# Patient Record
Sex: FEMALE | Race: BLACK OR AFRICAN AMERICAN | NOT HISPANIC OR LATINO | Employment: STUDENT | URBAN - METROPOLITAN AREA
[De-identification: names, ages, dates, MRNs, and addresses within clinical notes are randomized per-mention and may not be internally consistent; named-entity substitution may affect disease eponyms.]

---

## 2020-11-20 ENCOUNTER — OFFICE VISIT (OUTPATIENT)
Dept: CARDIOLOGY | Facility: CLINIC | Age: 25
End: 2020-11-20
Payer: COMMERCIAL

## 2020-11-20 VITALS
SYSTOLIC BLOOD PRESSURE: 125 MMHG | WEIGHT: 170.63 LBS | HEIGHT: 63 IN | BODY MASS INDEX: 30.23 KG/M2 | DIASTOLIC BLOOD PRESSURE: 81 MMHG | OXYGEN SATURATION: 99 % | HEART RATE: 72 BPM

## 2020-11-20 DIAGNOSIS — R55 SYNCOPE, UNSPECIFIED SYNCOPE TYPE: Primary | ICD-10-CM

## 2020-11-20 DIAGNOSIS — R55 SYNCOPE AND COLLAPSE: ICD-10-CM

## 2020-11-20 PROCEDURE — 99205 PR OFFICE/OUTPT VISIT, NEW, LEVL V, 60-74 MIN: ICD-10-PCS | Mod: 25,S$GLB,, | Performed by: INTERNAL MEDICINE

## 2020-11-20 PROCEDURE — 93000 EKG 12-LEAD: ICD-10-PCS | Mod: S$GLB,,, | Performed by: INTERNAL MEDICINE

## 2020-11-20 PROCEDURE — 99999 PR PBB SHADOW E&M-NEW PATIENT-LVL III: ICD-10-PCS | Mod: PBBFAC,,, | Performed by: INTERNAL MEDICINE

## 2020-11-20 PROCEDURE — 99999 PR PBB SHADOW E&M-NEW PATIENT-LVL III: CPT | Mod: PBBFAC,,, | Performed by: INTERNAL MEDICINE

## 2020-11-20 PROCEDURE — 99205 OFFICE O/P NEW HI 60 MIN: CPT | Mod: 25,S$GLB,, | Performed by: INTERNAL MEDICINE

## 2020-11-20 PROCEDURE — 93000 ELECTROCARDIOGRAM COMPLETE: CPT | Mod: S$GLB,,, | Performed by: INTERNAL MEDICINE

## 2020-11-20 NOTE — PROGRESS NOTES
"  Subjective:      Patient ID: Sophia العلي is a 25 y.o. female.    Chief Complaint: Loss of Consciousness    HPI:  Pt has been fainting since age 14.    Pt used to see Dr Raffy Lindsey.    Pt works as a pharmacist.    Pt was standing in wedding on the altar last Saturday when pt felt woozy and internally pt felt warm.  Then pt fell down.  Pt felt uneasy afterwards.  Pt took fluids and rested.    Last faint before this month was 2016.  Pt had 2 episodes.  Pt was waiting to be seated at [facility]. Pt was standing. It was crowded.  Pt had no premonitory warning. No injury. Pt was unresponsive for 30 seconds.  [facility].       Also in 2016 pt fainted while sitting on an airplane.  Pt is not sure how long she was unconscious for.    Once in high school pt vomited after fainting.    Pt has seen several neurologists.  "They do not think it is seizures.    There is no hx of injury associated with fainting except with the very first episode of fainting age 14.  The pt was in school in art class.  Pt was standing in a classroom.  Pt fainted without warning and broke her nose.    There is no hx of incontinence.    Dr Lindsey gave pt midodrine for orthostatic hypotension.  Pt stopped it because it made pt feel jittery.    Pt did not faint while taking midodrine.    This past Saturday was the first faint since 2016.  Pt has been off the midodrine since 2017.    Pt has had brain MRI's echocardiograms and has worn several heart monitors, stress test, and had a coronary angiogram.    Pt's mother said pt was born with an irregular heart beat but the pediatrician told her she would grow out of it.    Pt has worn support  Stockings in the past.  Pt fainted on the airplane while wearing support stockings.    Pt used to have "really bad migraines" often triggered by light.  Pt would have throbbing headaches around her eyes.  Pt did not faint with her migraines.    Pt hasn't had a bad migraine for years.    Pt has had fainting " episodes at school and in the mall and twice on a plane and in [place] and once at work.      Review of Systems   Cardiovascular: Positive for syncope. Negative for chest pain, claudication, dyspnea on exertion, irregular heartbeat, leg swelling, near-syncope, orthopnea and palpitations.        Past Medical History:   Diagnosis Date    Allergy     Bee sting allergy     Cat allergies     hair    Migraines     Syncope and collapse         Past Surgical History:   Procedure Laterality Date    none         Family History   Problem Relation Age of Onset    Hypertension Father     Breast cancer Maternal Grandmother     Cancer Maternal Grandfather     Breast cancer Paternal Grandmother     Diabetes Paternal Grandfather        Social History     Socioeconomic History    Marital status: Single     Spouse name: Not on file    Number of children: Not on file    Years of education: Not on file    Highest education level: Not on file   Occupational History    Occupation: pharmacy intern    Social Needs    Financial resource strain: Not on file    Food insecurity     Worry: Not on file     Inability: Not on file    Transportation needs     Medical: Not on file     Non-medical: Not on file   Tobacco Use    Smoking status: Never Smoker    Smokeless tobacco: Never Used   Substance and Sexual Activity    Alcohol use: Yes     Comment: occasional    Drug use: No    Sexual activity: Yes     Partners: Male     Birth control/protection: OCP   Lifestyle    Physical activity     Days per week: Not on file     Minutes per session: Not on file    Stress: Not on file   Relationships    Social connections     Talks on phone: Not on file     Gets together: Not on file     Attends Hoahaoism service: Not on file     Active member of club or organization: Not on file     Attends meetings of clubs or organizations: Not on file     Relationship status: Not on file   Other Topics Concern    Not on file   Social History  "Narrative    Not on file       Current Outpatient Medications on File Prior to Visit   Medication Sig Dispense Refill    [DISCONTINUED] CRYSELLE, 28, 0.3-30 mg-mcg per tablet       [DISCONTINUED] midodrine (PROAMATINE) 10 MG tablet       [DISCONTINUED] sumatriptan (IMITREX) 100 MG tablet Take 1 tablet (100 mg total) by mouth every 12 (twelve) hours as needed for Migraine. 12 tablet 0     No current facility-administered medications on file prior to visit.        Review of patient's allergies indicates:  No Known Allergies  Objective:     Vitals:    11/20/20 0811   BP: 125/81   BP Location: Left arm   Patient Position: Sitting   BP Method: Large (Automatic)   Pulse: 72   SpO2: 99%   Weight: 77.4 kg (170 lb 10.2 oz)   Height: 5' 3" (1.6 m)        Physical Exam   Constitutional: She is oriented to person, place, and time. She appears well-developed and well-nourished.   Eyes: No scleral icterus.   Neck: No JVD present. Carotid bruit is not present.   Cardiovascular: Normal rate and regular rhythm. Exam reveals no gallop.   Murmur (I/VI systolic) heard.  Pulmonary/Chest: Breath sounds normal.   Musculoskeletal:         General: No edema.   Neurological: She is alert and oriented to person, place, and time.   Skin: Skin is warm and dry.   Psychiatric: She has a normal mood and affect. Her behavior is normal. Judgment and thought content normal.   Vitals reviewed.     ECG: NSR with sinus arrhythmia , WNL  Assessment:     1. Syncope, unspecified syncope type    2. Syncope and collapse      Plan:   Sophia was seen today for loss of consciousness.    Diagnoses and all orders for this visit:    Syncope, unspecified syncope type  -     IN OFFICE EKG 12-LEAD (to [facility])  -     CBC Auto Differential; Future  -     Comprehensive Metabolic Panel; Future  -     Lipid Panel; Future  -     TSH; Future  -     Holter monitor - 48 hour; Future  -     Echo Color Flow Doppler? Yes; Future    Syncope and collapse  -     IN OFFICE EKG " 12-LEAD (to [place])     Dr Cho's consult from several years ago reviewed.  He did not think pt was having seizures.    Patient likely has some combination of orthostatic hypotension and vasovagal syncope    Will repeat the echocardiogram and holter monitor.    Pt encouraged to wear support support stockings and to stay well hydrated and to increase salt intake    Consult Dr Zac De La Garza    Check labs    No follow-ups on file.

## 2020-11-23 ENCOUNTER — TELEPHONE (OUTPATIENT)
Dept: ELECTROPHYSIOLOGY | Facility: CLINIC | Age: 25
End: 2020-11-23

## 2020-11-24 ENCOUNTER — TELEPHONE (OUTPATIENT)
Dept: CARDIOLOGY | Facility: CLINIC | Age: 25
End: 2020-11-24

## 2020-11-24 ENCOUNTER — TELEPHONE (OUTPATIENT)
Dept: ELECTROPHYSIOLOGY | Facility: CLINIC | Age: 25
End: 2020-11-24

## 2020-11-25 ENCOUNTER — OFFICE VISIT (OUTPATIENT)
Dept: ELECTROPHYSIOLOGY | Facility: CLINIC | Age: 25
End: 2020-11-25
Payer: COMMERCIAL

## 2020-11-25 VITALS
WEIGHT: 170 LBS | BODY MASS INDEX: 30.12 KG/M2 | SYSTOLIC BLOOD PRESSURE: 115 MMHG | HEIGHT: 63 IN | HEART RATE: 64 BPM | DIASTOLIC BLOOD PRESSURE: 78 MMHG

## 2020-11-25 DIAGNOSIS — R55 SYNCOPE, UNSPECIFIED SYNCOPE TYPE: Primary | ICD-10-CM

## 2020-11-25 DIAGNOSIS — R55 SYNCOPE, UNSPECIFIED SYNCOPE TYPE: ICD-10-CM

## 2020-11-25 DIAGNOSIS — R55 SYNCOPE AND COLLAPSE: ICD-10-CM

## 2020-11-25 PROCEDURE — 99999 PR PBB SHADOW E&M-EST. PATIENT-LVL III: CPT | Mod: PBBFAC,,, | Performed by: INTERNAL MEDICINE

## 2020-11-25 PROCEDURE — 99999 PR PBB SHADOW E&M-EST. PATIENT-LVL III: ICD-10-PCS | Mod: PBBFAC,,, | Performed by: INTERNAL MEDICINE

## 2020-11-25 PROCEDURE — 99245 OFF/OP CONSLTJ NEW/EST HI 55: CPT | Mod: S$GLB,,, | Performed by: INTERNAL MEDICINE

## 2020-11-25 PROCEDURE — 93005 ELECTROCARDIOGRAM TRACING: CPT | Mod: S$GLB,,, | Performed by: INTERNAL MEDICINE

## 2020-11-25 PROCEDURE — 93010 ELECTROCARDIOGRAM REPORT: CPT | Mod: S$GLB,,, | Performed by: INTERNAL MEDICINE

## 2020-11-25 PROCEDURE — 93005 RHYTHM STRIP: ICD-10-PCS | Mod: S$GLB,,, | Performed by: INTERNAL MEDICINE

## 2020-11-25 PROCEDURE — 99245 PR OFFICE CONSULTATION,LEVEL V: ICD-10-PCS | Mod: S$GLB,,, | Performed by: INTERNAL MEDICINE

## 2020-11-25 PROCEDURE — 93010 RHYTHM STRIP: ICD-10-PCS | Mod: S$GLB,,, | Performed by: INTERNAL MEDICINE

## 2020-11-25 NOTE — LETTER
November 25, 2020      Oliver Wolfe MD  [address]  [address]  [facility] [address]           [id number]  [address] [address]  Phone: [phone]  Fax: [phone]          Patient: Sophia العلي   MR Number: [id number]   YOB: 1995   Date of Visit: 11/25/2020       Dear Dr. Oliver Wolfe:    Thank you for referring Sophia العلي to me for evaluation. Attached you will find relevant portions of my assessment and plan of care.    If you have questions, please do not hesitate to call me. I look forward to following Sophia العلي along with you.    Sincerely,    Ed De La Garza MD    Enclosure  CC:  No Recipients    If you would like to receive this communication electronically, please contact [email or web address] or [phone] to request more information on [facility] access.    For providers and/or their staff who would like to refer a patient to [facility], please contact us through our one-stop-shop provider referral line, [facility], at [phone].    If you feel you have received this communication in error or would no longer like to receive these types of communications, please e-mail [email or web address]

## 2020-11-25 NOTE — TELEPHONE ENCOUNTER
Echocardiogram shows heart is perfectly strong with no cardiac abnormality to explain fainting.  Pt reassured

## 2020-11-25 NOTE — PROGRESS NOTES
Subjective:    Patient ID:  Sophia العلي is a 25 y.o. 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  Some episodes in past had no prodrome. None for past 4 years.  Did use midodrine for a year or so about 4 years ago (10 mg TID) but she felt jittery with that so they stopped it. No syncope during midodrine or since (x4 years).  On 11/14/20, she was a bridesmaid at a wedding, standing at service, when had LH x 1 minute followed by vasovagal/orthostatic type faint. Felt unwell for an hour afterward.    No FH SCD  echo normal    My interpretation of today's ECG is SB    Review of Systems   Constitution: Negative. Negative for malaise/fatigue.   HENT: Negative.  Negative for ear pain and tinnitus.    Eyes: Negative for blurred vision.   Cardiovascular: Positive for syncope. Negative for chest pain, dyspnea on exertion, near-syncope and palpitations.   Respiratory: Negative.  Negative for shortness of breath.    Endocrine: Negative.  Negative for polyuria.   Hematologic/Lymphatic: Does not bruise/bleed easily.   Skin: Negative.  Negative for rash.   Musculoskeletal: Negative.  Negative for joint pain and muscle weakness.   Gastrointestinal: Negative.  Negative for abdominal pain and change in bowel habit.   Genitourinary: Negative for frequency.   Neurological: Negative for dizziness and weakness.   Psychiatric/Behavioral: Negative.  Negative for depression. The patient is not nervous/anxious.    Allergic/Immunologic: Negative for environmental allergies.        Objective:    Physical Exam   Constitutional: She is oriented to person, place, and time. Vital signs are normal. She appears well-developed and well-nourished. She is active and cooperative.   HENT:   Head: Normocephalic and atraumatic.   Eyes: Conjunctivae and EOM are normal.   Neck: Normal range of motion. Carotid bruit is not present. No tracheal deviation and no edema  present. No thyroid mass and no thyromegaly present.   Cardiovascular: Normal rate, regular rhythm and normal pulses.   Pulmonary/Chest: Effort normal and breath sounds normal. No respiratory distress. She has no wheezes.   Abdominal: Normal appearance. She exhibits no distension. There is no hepatosplenomegaly.   Musculoskeletal: Normal range of motion.   Neurological: She is alert and oriented to person, place, and time. Coordination normal.   Skin: Skin is warm and dry. No rash noted.   Psychiatric: She has a normal mood and affect. Her speech is normal and behavior is normal. Thought content normal. Cognition and memory are normal.   Nursing note and vitals reviewed.        Assessment:       1. Syncope, unspecified syncope type    2. Syncope and collapse         Plan:       Discussed with patient orthostatic/vasovagal mediated hypotension and the resultant decreased level of consciousness that can result. Discussed counteractive measures including standing up slowly, remaining hydrated, support hose, salt intake, exercise stressing lower extremity strength.  She'll make those behavioral changes. If syncope recurs, could consider low-dose midodrine (eg 2.5 or 5 mg).  Given syncope, instructed patient not to drive (or perform other activities that may be dangerous if loss of consciousness were to recur) for at least six months.  Discussed options for scheduled f/u or prn; she chooses prn.

## 2020-12-01 ENCOUNTER — TELEPHONE (OUTPATIENT)
Dept: CARDIOLOGY | Facility: CLINIC | Age: 25
End: 2020-12-01

## 2021-04-26 ENCOUNTER — PATIENT MESSAGE (OUTPATIENT)
Dept: RESEARCH | Facility: HOSPITAL | Age: 26
End: 2021-04-26

## 2022-11-06 ENCOUNTER — OFFICE VISIT (OUTPATIENT)
Dept: URGENT CARE | Facility: CLINIC | Age: 27
End: 2022-11-06
Payer: COMMERCIAL

## 2022-11-06 VITALS
WEIGHT: 170 LBS | DIASTOLIC BLOOD PRESSURE: 87 MMHG | HEART RATE: 92 BPM | BODY MASS INDEX: 30.12 KG/M2 | OXYGEN SATURATION: 99 % | SYSTOLIC BLOOD PRESSURE: 132 MMHG | HEIGHT: 63 IN | TEMPERATURE: 99 F | RESPIRATION RATE: 18 BRPM

## 2022-11-06 DIAGNOSIS — J02.9 SORE THROAT: ICD-10-CM

## 2022-11-06 DIAGNOSIS — J11.1 INFLUENZA: Primary | ICD-10-CM

## 2022-11-06 LAB
CTP QC/QA: YES
MOLECULAR STREP A: NEGATIVE
POC MOLECULAR INFLUENZA A AGN: NEGATIVE
POC MOLECULAR INFLUENZA B AGN: NEGATIVE
SARS-COV-2 RDRP RESP QL NAA+PROBE: NEGATIVE

## 2022-11-06 PROCEDURE — 87635: ICD-10-PCS | Mod: QW,S$GLB,, | Performed by: NURSE PRACTITIONER

## 2022-11-06 PROCEDURE — 87502 INFLUENZA DNA AMP PROBE: CPT | Mod: QW,S$GLB,, | Performed by: NURSE PRACTITIONER

## 2022-11-06 PROCEDURE — 87635 SARS-COV-2 COVID-19 AMP PRB: CPT | Mod: QW,S$GLB,, | Performed by: NURSE PRACTITIONER

## 2022-11-06 PROCEDURE — 87651 POCT STREP A MOLECULAR: ICD-10-PCS | Mod: QW,S$GLB,, | Performed by: NURSE PRACTITIONER

## 2022-11-06 PROCEDURE — 87502 POCT INFLUENZA A/B MOLECULAR: ICD-10-PCS | Mod: QW,S$GLB,, | Performed by: NURSE PRACTITIONER

## 2022-11-06 PROCEDURE — 87651 STREP A DNA AMP PROBE: CPT | Mod: QW,S$GLB,, | Performed by: NURSE PRACTITIONER

## 2022-11-06 PROCEDURE — 99213 PR OFFICE/OUTPT VISIT, EST, LEVL III, 20-29 MIN: ICD-10-PCS | Mod: S$GLB,,, | Performed by: NURSE PRACTITIONER

## 2022-11-06 PROCEDURE — 99213 OFFICE O/P EST LOW 20 MIN: CPT | Mod: S$GLB,,, | Performed by: NURSE PRACTITIONER

## 2022-11-06 NOTE — LETTER
November 6, 2022      Urgent [facility] - [address] [address]  Phone: [phone]  Fax: [phone]       Patient: Sophia العلي   YOB: 1995  Date of Visit: 11/06/2022    To Whom It May Concern:    Katie العلي  was at [facility] on 11/06/2022. The patient may return to work/school on 11/14/2022 or sooner if feeling better,  with no restrictions. If you have any questions or concerns, or if I can be of further assistance, please do not hesitate to contact me.    Sincerely,    Dayanna Garnett, NP

## 2022-11-06 NOTE — PROGRESS NOTES
"Subjective:       Patient ID: Sophia العلي is a 27 y.o. female.    Vitals:  height is 5' 3" (1.6 m) and weight is 77.1 kg (170 lb). Her temperature is 99.1 °F (37.3 °C). Her blood pressure is 132/87 and her pulse is 92. Her respiration is 18 and oxygen saturation is 99%.     Chief Complaint: Sore Throat (/)    Pt presents with complaint of sore throat, body aches, chills and fever since yesterday.  Pt states she has taken tylenol for her symptoms.  Pt states she wants covid and flu testing.  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  Does not feel like she has COVID.  Reports chills body aches sore throat and sinus congestion.    Sore Throat   This is a new problem. The current episode started yesterday. The problem has been unchanged. Neither side of throat is experiencing more pain than the other. There has been no fever. The fever has been present for Less than 1 day. The pain is at a severity of 6/10. The pain is moderate. Associated symptoms include congestion. Pertinent negatives include no coughing, diarrhea, shortness of breath or vomiting. She has had no exposure to strep or mono. She has tried acetaminophen for the symptoms. The treatment provided mild relief.     Constitution: Positive for chills, fatigue and fever.   HENT:  Positive for congestion and sore throat.    Cardiovascular:  Negative for chest pain and sob on exertion.   Respiratory:  Negative for cough and shortness of breath.    Gastrointestinal:  Negative for nausea, vomiting, constipation and diarrhea.     Objective:      Physical Exam   Constitutional: She is oriented to person, place, and time.   HENT:   Head: Normocephalic and atraumatic.   Mouth/Throat: Posterior oropharyngeal erythema present. No oropharyngeal exudate.   Cardiovascular: Normal rate.   Pulmonary/Chest: Effort normal. No respiratory distress.   Abdominal: Normal " appearance.   Neurological: She is alert and oriented to person, place, and time.   Skin: Skin is warm and dry.   Psychiatric: Her behavior is normal. Mood normal.         Results for orders placed or performed in visit on 11/06/22   POCT Influenza A/B MOLECULAR   Result Value Ref Range    POC Molecular Influenza A Ag Negative Negative, Not Reported    POC Molecular Influenza B Ag Negative Negative, Not Reported     Acceptable Yes    POCT COVID-19 Rapid Screening   Result Value Ref Range    POC Rapid COVID Negative Negative     Acceptable Yes    POCT Strep A, Molecular   Result Value Ref Range    Molecular Strep A, POC Negative Negative     Acceptable Yes       Assessment:       1. Influenza    2. Sore throat          Plan:       An antiviral has been prescribed  Hydrate with gatorade, Powerade, or Pedialyte   May alternate Tylenol with ibuprofen   You are contagious until you are fever free for 24 hours.  Or 7 days which ever is most current.       Strep negative  Flu test likely false negative      Influenza    Sore throat  -     POCT Influenza A/B MOLECULAR  -     POCT COVID-19 Rapid Screening  -     POCT Strep A, Molecular

## 2024-02-26 ENCOUNTER — HOSPITAL ENCOUNTER (EMERGENCY)
Facility: HOSPITAL | Age: 29
Discharge: HOME | End: 2024-02-26
Attending: STUDENT IN AN ORGANIZED HEALTH CARE EDUCATION/TRAINING PROGRAM
Payer: COMMERCIAL

## 2024-02-26 VITALS
SYSTOLIC BLOOD PRESSURE: 110 MMHG | OXYGEN SATURATION: 100 % | BODY MASS INDEX: 31.89 KG/M2 | RESPIRATION RATE: 16 BRPM | HEART RATE: 94 BPM | DIASTOLIC BLOOD PRESSURE: 75 MMHG | WEIGHT: 180 LBS | HEIGHT: 63 IN | TEMPERATURE: 99 F

## 2024-02-26 DIAGNOSIS — R10.9 ABDOMINAL PAIN: ICD-10-CM

## 2024-02-26 DIAGNOSIS — K52.9 GASTROENTERITIS: Primary | ICD-10-CM

## 2024-02-26 LAB
ALBUMIN SERPL BCP-MCNC: 3.2 G/DL (ref 3.5–5.2)
ALP SERPL-CCNC: 57 U/L (ref 55–135)
ALT SERPL W/O P-5'-P-CCNC: 18 U/L (ref 10–44)
ANION GAP SERPL CALC-SCNC: 14 MMOL/L (ref 8–16)
AST SERPL-CCNC: 20 U/L (ref 10–40)
B-HCG UR QL: NEGATIVE
BACTERIA #/AREA URNS AUTO: NORMAL /HPF
BASOPHILS # BLD AUTO: 0.01 K/UL (ref 0–0.2)
BASOPHILS NFR BLD: 0.1 % (ref 0–1.9)
BILIRUB SERPL-MCNC: 0.3 MG/DL (ref 0.1–1)
BILIRUB UR QL STRIP: NEGATIVE
BUN SERPL-MCNC: 14 MG/DL (ref 6–20)
CALCIUM SERPL-MCNC: 8.9 MG/DL (ref 8.7–10.5)
CHLORIDE SERPL-SCNC: 106 MMOL/L (ref 95–110)
CLARITY UR REFRACT.AUTO: CLEAR
CO2 SERPL-SCNC: 18 MMOL/L (ref 23–29)
COLOR UR AUTO: YELLOW
CREAT SERPL-MCNC: 0.8 MG/DL (ref 0.5–1.4)
CTP QC/QA: YES
DIFFERENTIAL METHOD BLD: ABNORMAL
EOSINOPHIL # BLD AUTO: 0 K/UL (ref 0–0.5)
EOSINOPHIL NFR BLD: 0 % (ref 0–8)
ERYTHROCYTE [DISTWIDTH] IN BLOOD BY AUTOMATED COUNT: 14.5 % (ref 11.5–14.5)
EST. GFR  (NO RACE VARIABLE): >60 ML/MIN/1.73 M^2
GLUCOSE SERPL-MCNC: 121 MG/DL (ref 70–110)
GLUCOSE UR QL STRIP: NEGATIVE
HCT VFR BLD AUTO: 39.9 % (ref 37–48.5)
HCV AB SERPL QL IA: REACTIVE
HGB BLD-MCNC: 13.6 G/DL (ref 12–16)
HGB UR QL STRIP: ABNORMAL
HIV 1+2 AB+HIV1 P24 AG SERPL QL IA: NORMAL
IMM GRANULOCYTES # BLD AUTO: 0.02 K/UL (ref 0–0.04)
IMM GRANULOCYTES NFR BLD AUTO: 0.2 % (ref 0–0.5)
KETONES UR QL STRIP: NEGATIVE
LEUKOCYTE ESTERASE UR QL STRIP: NEGATIVE
LIPASE SERPL-CCNC: 20 U/L (ref 4–60)
LYMPHOCYTES # BLD AUTO: 0.6 K/UL (ref 1–4.8)
LYMPHOCYTES NFR BLD: 6.9 % (ref 18–48)
MCH RBC QN AUTO: 29.8 PG (ref 27–31)
MCHC RBC AUTO-ENTMCNC: 34.1 G/DL (ref 32–36)
MCV RBC AUTO: 87 FL (ref 82–98)
MICROSCOPIC COMMENT: NORMAL
MONOCYTES # BLD AUTO: 0.4 K/UL (ref 0.3–1)
MONOCYTES NFR BLD: 4.2 % (ref 4–15)
NEUTROPHILS # BLD AUTO: 7.4 K/UL (ref 1.8–7.7)
NEUTROPHILS NFR BLD: 88.6 % (ref 38–73)
NITRITE UR QL STRIP: NEGATIVE
NRBC BLD-RTO: 0 /100 WBC
OHS QRS DURATION: 80 MS
OHS QTC CALCULATION: 435 MS
PH UR STRIP: 6 [PH] (ref 5–8)
PLATELET # BLD AUTO: 311 K/UL (ref 150–450)
PMV BLD AUTO: 10.9 FL (ref 9.2–12.9)
POTASSIUM SERPL-SCNC: 4 MMOL/L (ref 3.5–5.1)
PROT SERPL-MCNC: 7.8 G/DL (ref 6–8.4)
PROT UR QL STRIP: ABNORMAL
RBC # BLD AUTO: 4.57 M/UL (ref 4–5.4)
RBC #/AREA URNS AUTO: 2 /HPF (ref 0–4)
SODIUM SERPL-SCNC: 138 MMOL/L (ref 136–145)
SP GR UR STRIP: >1.03 (ref 1–1.03)
SQUAMOUS #/AREA URNS AUTO: 5 /HPF
URN SPEC COLLECT METH UR: ABNORMAL
WBC # BLD AUTO: 8.37 K/UL (ref 3.9–12.7)
WBC #/AREA URNS AUTO: 2 /HPF (ref 0–5)

## 2024-02-26 PROCEDURE — 93005 ELECTROCARDIOGRAM TRACING: CPT

## 2024-02-26 PROCEDURE — 87389 HIV-1 AG W/HIV-1&-2 AB AG IA: CPT | Performed by: PHYSICIAN ASSISTANT

## 2024-02-26 PROCEDURE — 63600175 PHARM REV CODE 636 W HCPCS: Performed by: STUDENT IN AN ORGANIZED HEALTH CARE EDUCATION/TRAINING PROGRAM

## 2024-02-26 PROCEDURE — 81001 URINALYSIS AUTO W/SCOPE: CPT | Performed by: STUDENT IN AN ORGANIZED HEALTH CARE EDUCATION/TRAINING PROGRAM

## 2024-02-26 PROCEDURE — 99285 EMERGENCY DEPT VISIT HI MDM: CPT | Mod: 25

## 2024-02-26 PROCEDURE — 96374 THER/PROPH/DIAG INJ IV PUSH: CPT

## 2024-02-26 PROCEDURE — 96361 HYDRATE IV INFUSION ADD-ON: CPT

## 2024-02-26 PROCEDURE — 83690 ASSAY OF LIPASE: CPT | Performed by: STUDENT IN AN ORGANIZED HEALTH CARE EDUCATION/TRAINING PROGRAM

## 2024-02-26 PROCEDURE — 86803 HEPATITIS C AB TEST: CPT | Performed by: PHYSICIAN ASSISTANT

## 2024-02-26 PROCEDURE — 80053 COMPREHEN METABOLIC PANEL: CPT | Performed by: STUDENT IN AN ORGANIZED HEALTH CARE EDUCATION/TRAINING PROGRAM

## 2024-02-26 PROCEDURE — 93010 ELECTROCARDIOGRAM REPORT: CPT | Mod: ,,, | Performed by: INTERNAL MEDICINE

## 2024-02-26 PROCEDURE — 85025 COMPLETE CBC W/AUTO DIFF WBC: CPT | Performed by: STUDENT IN AN ORGANIZED HEALTH CARE EDUCATION/TRAINING PROGRAM

## 2024-02-26 PROCEDURE — 81025 URINE PREGNANCY TEST: CPT | Performed by: STUDENT IN AN ORGANIZED HEALTH CARE EDUCATION/TRAINING PROGRAM

## 2024-02-26 PROCEDURE — 96375 TX/PRO/DX INJ NEW DRUG ADDON: CPT

## 2024-02-26 PROCEDURE — 25500020 PHARM REV CODE 255: Performed by: STUDENT IN AN ORGANIZED HEALTH CARE EDUCATION/TRAINING PROGRAM

## 2024-02-26 PROCEDURE — 25000003 PHARM REV CODE 250: Performed by: STUDENT IN AN ORGANIZED HEALTH CARE EDUCATION/TRAINING PROGRAM

## 2024-02-26 PROCEDURE — 87522 HEPATITIS C REVRS TRNSCRPJ: CPT | Performed by: PHYSICIAN ASSISTANT

## 2024-02-26 RX ORDER — ONDANSETRON 4 MG/1
4 TABLET, FILM COATED ORAL EVERY 6 HOURS
Qty: 12 TABLET | Refills: 0 | Status: SHIPPED | OUTPATIENT
Start: 2024-02-26 | End: 2024-02-29

## 2024-02-26 RX ORDER — DICYCLOMINE HYDROCHLORIDE 20 MG/1
20 TABLET ORAL 2 TIMES DAILY
Qty: 20 TABLET | Refills: 0 | Status: SHIPPED | OUTPATIENT
Start: 2024-02-26 | End: 2024-03-07

## 2024-02-26 RX ORDER — ONDANSETRON HYDROCHLORIDE 2 MG/ML
4 INJECTION, SOLUTION INTRAVENOUS
Status: COMPLETED | OUTPATIENT
Start: 2024-02-26 | End: 2024-02-26

## 2024-02-26 RX ORDER — MORPHINE SULFATE 4 MG/ML
4 INJECTION, SOLUTION INTRAMUSCULAR; INTRAVENOUS
Status: COMPLETED | OUTPATIENT
Start: 2024-02-26 | End: 2024-02-26

## 2024-02-26 RX ADMIN — MORPHINE SULFATE 4 MG: 4 INJECTION INTRAVENOUS at 10:02

## 2024-02-26 RX ADMIN — ONDANSETRON 4 MG: 2 INJECTION INTRAMUSCULAR; INTRAVENOUS at 10:02

## 2024-02-26 RX ADMIN — IOHEXOL 100 ML: 350 INJECTION, SOLUTION INTRAVENOUS at 10:02

## 2024-02-26 RX ADMIN — SODIUM CHLORIDE 1000 ML: 9 INJECTION, SOLUTION INTRAVENOUS at 10:02

## 2024-02-26 NOTE — ED TRIAGE NOTES
Pt presents to the ED complaining of severe abdominal pain originating late in the afternoon yesterday. Pt reports consistent nausea, vomiting, and diarrhea since her symptoms originated yesterday. Pt states the abdominal pain gets worse with movement and fluctuates from 5/10 pain to 9/10 pain. Pt reports she is currently at 5/10 pain. Pt states she tried taking pepto, however, she is unable to keep anything down.

## 2024-02-26 NOTE — Clinical Note
"Sophia Pagekolton العلي was seen and treated in our emergency department on 2/26/2024.  She may return to work on 02/28/2024.       If you have any questions or concerns, please don't hesitate to call.      Rachael Zarco NRP     "

## 2024-02-26 NOTE — Clinical Note
"Sophia Pagekolton العلي was seen and treated in our emergency department on 2/26/2024.  She may return to work on 02/28/2024.       If you have any questions or concerns, please don't hesitate to call.      Colette Dasilva MD"

## 2024-02-26 NOTE — DISCHARGE INSTRUCTIONS
You were seen today for abdominal pain and nausea, vomiting and diarrhea.  Your CT today.  It is likely that you have a viral gastroenteritis.  You have been prescribed Zofran for nausea and vomiting at home as well as Bentyl for abdominal cramping and pain.  Please take these as prescribed.  If your symptoms worsen, you are unable to stay hydrated at home, you develop high persistent fever or any other concerns, you should return to the emergency department for re-evaluation.  Please follow-up with your primary care doctor within the next week.

## 2024-02-26 NOTE — ED PROVIDER NOTES
Encounter Date: 2/26/2024       History     Chief Complaint   Patient presents with    Abdominal Pain    Nausea    Vomiting    Diarrhea     HPI  Patient is a previously healthy 29-year-old female with no prior abdominal surgeries who presents for abdominal pain and nausea, vomiting and diarrhea.  Onset of symptoms yesterday evening.  She reports severe nausea with multiple episodes of vomiting.  Emesis is nonbloody, nonbilious.  She states that she was unable to tolerate p.o. intake at home.  She was also been having multiple episodes of diarrhea hourly.  She reports watery, nonbloody stool.  She also reports severe abdominal cramping.  It is worse around her umbilicus and lower abdomen.  She states that she has had gastrointestinal symptoms like this in the past but she was never had this severe of abdominal cramping.  She reports that taking her son to school today that she would have severe pain anytime she hit a bump.  She reports low-grade temperatures at home with highest being 99.9 F. She has been taking Pepto-Bismol at home as needed but has not taken any other medications.  No chills.  No urinary symptoms.  No abnormal vaginal discharge. No chest pain or shortness of breath.      Review of patient's allergies indicates:  No Known Allergies  Past Medical History:   Diagnosis Date    Allergy     Bee sting allergy     Cat allergies     hair    Migraines     Syncope and collapse      Past Surgical History:   Procedure Laterality Date    none       Family History   Problem Relation Age of Onset    Hypertension Father     Breast cancer Maternal Grandmother     Cancer Maternal Grandfather     Breast cancer Paternal Grandmother     Diabetes Paternal Grandfather      Social History     Tobacco Use    Smoking status: Never    Smokeless tobacco: Never   Substance Use Topics    Alcohol use: Yes     Comment: occasional    Drug use: No     Review of Systems  A full review of systems was obtained, see HPI for pertinent  positives.    Physical Exam     Initial Vitals [02/26/24 0848]   BP Pulse Resp Temp SpO2   128/75 100 18 99.7 °F (37.6 °C) 100 %      MAP       --         Physical Exam  Constitutional: No acute distress, well-appearing, nontoxic  Respiratory: Non-labored, lungs clear  Cardiovascular: Well perfused, normal rate, regular rhythm  Gastrointestinal: Soft, nondistended, mildly tender to palpation around the umbilicus and right lower quadrant without rebound or guarding  Integumentary: Warm and dry  Musculoskeletal: No deformity  Neurological: Awake and alert  Psychiatric: Cooperative     ED Course   Procedures  Labs Reviewed   HEPATITIS C ANTIBODY - Abnormal; Notable for the following components:       Result Value    Hepatitis C Ab Reactive (*)     All other components within normal limits    Narrative:     Release to patient->Immediate   URINALYSIS, REFLEX TO URINE CULTURE - Abnormal; Notable for the following components:    Specific Gravity, UA >1.030 (*)     Protein, UA Trace (*)     Occult Blood UA 1+ (*)     All other components within normal limits    Narrative:     Specimen Source->Urine   CBC W/ AUTO DIFFERENTIAL - Abnormal; Notable for the following components:    Lymph # 0.6 (*)     Gran % 88.6 (*)     Lymph % 6.9 (*)     All other components within normal limits    Narrative:     Release to patient->Immediate   COMPREHENSIVE METABOLIC PANEL - Abnormal; Notable for the following components:    CO2 18 (*)     Glucose 121 (*)     Albumin 3.2 (*)     All other components within normal limits    Narrative:     Release to patient->Immediate   HIV 1 / 2 ANTIBODY    Narrative:     Release to patient->Immediate   LIPASE    Narrative:     Release to patient->Immediate   URINALYSIS MICROSCOPIC    Narrative:     Specimen Source->Urine   HEPATITIS C RNA, QUANTITATIVE, PCR   POCT URINE PREGNANCY        ECG Results              EKG 12-lead (Final result)        Collection Time Result Time QRS Duration [facility] QTC Calculation     02/26/24 09:53:44 02/26/24 10:38:19 80 435                     Final result by Interface, Lab In [facility] (02/26/24 10:38:24)                   Narrative:    Test Reason : R10.9,    Vent. Rate : 111 BPM     Atrial Rate : 111 BPM     P-R Int : 116 ms          QRS Dur : 080 ms      QT Int : 320 ms       P-R-T Axes : 067 048 009 degrees     QTc Int : 435 ms    Sinus tachycardia  Abnormal ECG  When compared with ECG of 25-NOV-2020 08:15,  Vent. rate has increased BY  53 BPM  Inverted T waves have replaced nonspecific T wave abnormality in Inferior  leads  Confirmed by SANDOVAL NAJERA MD ([id number]) on 2/26/2024 10:38:12 AM    Referred By: [email or web address]   SELF           Confirmed By:SANDOVAL NAJERA MD                                  Imaging Results              CT Abdomen Pelvis With IV Contrast NO Oral Contrast (Final result)  Result time 02/26/24 11:08:56      Final result by Jeromy Druan MD (02/26/24 11:08:56)                   Impression:      1. Suggested mild wall thickening of the stomach with mucosal hyperemia and submucosal edema, as may be seen in setting of gastritis.  2. Nonspecific intraluminal fluid within cecum and proximal loops of colon, as may be seen in the setting of a nonspecific diarrheal illness.  3. Additional details, as provided in the body of report.      Electronically signed by: Jeromy Duran  Date:    02/26/2024  Time:    11:08               Narrative:    EXAMINATION:  CT ABDOMEN PELVIS WITH IV CONTRAST    CLINICAL HISTORY:  Nausea/vomiting;    TECHNIQUE:  Low dose axial images, sagittal and coronal reformations were obtained from the lung bases to the pubic symphysis following the IV administration of 100 mL of Omnipaque 350    COMPARISON:  None.    FINDINGS:  Lower chest: Unremarkable.    Liver: Unremarkable.    Gallbladder and bile ducts: Unremarkable. No biliary ductal dilatation.    Pancreas: Unremarkable.    Spleen: Unremarkable.    Adrenals: Unremarkable.    Kidneys:  Unremarkable.    Lymph nodes: No abdominal or pelvic lymphadenopathy.    Bowel and mesentery: Question mild wall thickening of the stomach with mucosal hyperemia and submucosal edema.  No evidence of bowel obstruction.  Normal appendix.Nonspecific intraluminal fluid within loops of proximal colon and cecum.    Abdominal aorta: Unremarkable.    Inferior vena cava: Unremarkable.    Free fluid or free air: None.    Pelvis: Unremarkable.    Body wall: Small fat containing umbilical hernia.    Bones: Unremarkable.                                       Medications   sodium chloride 0.9% bolus 1,000 mL 1,000 mL (0 mLs Intravenous Stopped 2/26/24 1156)   ondansetron injection 4 mg (4 mg Intravenous Given 2/26/24 1041)   morphine injection 4 mg (4 mg Intravenous Given 2/26/24 1040)   iohexoL (OMNIPAQUE 350) injection 100 mL (100 mLs Intravenous Given 2/26/24 1053)     Medical Decision Making  Patient is a very pleasant 29-year-old female who was previously healthy who presents for abdominal pain, nausea, vomiting and diarrhea.  She was nontoxic on exam.  Afebrile with reassuring vitals.  Overall, I suspect a viral gastroenteritis given the vomiting and diarrhea.  However, she was reporting significant abdominal pain.  She is tender in the right lower quadrant and around her umbilicus.  I think appendicitis is lower on the differential given the diarrhea however patient is stating that she was having pain even when she hits bumps in the car.  Overall her abdominal exam seems non peritonitic but will obtain CT to rule out appendicitis, colitis, other intra-abdominal pathologies.  Will go ahead and treat with IV fluids, antiemetics and provide pain control.  Anticipate patient can be discharged home if improved and workup reassuring.    Labs and imaging reviewed.  CT suggesting gastroenteritis.  No other acute findings.  Patient significantly improved on re-evaluation.  She was resting comfortably.  No recurrent vomiting here  in the emergency department.  She does have a reactive hepatitis-C antibody and was counseled on this.  Confirmatory testing sent.  She was advised to follow up with the primary care doctor.  She was counseled on return precautions prior to discharge.    Amount and/or Complexity of Data Reviewed  Labs: ordered. Decision-making details documented in ED Course.  Radiology: ordered.  ECG/medicine tests: ordered and independent interpretation performed. Decision-making details documented in ED Course.    Risk  Prescription drug management.               ED Course as of 02/26/24 1726   Mon Feb 26, 2024   0949 hCG Qualitative, Urine: Negative [NN]   0957 EKG with sinus tachycardia, rate 111, no STEMI [NN]   1049 WBC: 8.37 [NN]   1119 Hepatitis C Ab(!): Reactive  Confirmatory testing ordered [NN]   1121 mpression:     1. Suggested mild wall thickening of the stomach with mucosal hyperemia and submucosal edema, as may be seen in setting of gastritis.  2. Nonspecific intraluminal fluid within cecum and proximal loops of colon, as may be seen in the setting of a nonspecific diarrheal illness.  3. Additional details, as provided in the body of report.   [NN]   1141 Patient counseled on positive hepatitis-C antibody testing.  Confirmatory testing sent.  Patient was advised to follow up with her primary care doctor for this. [NN]   1142 Patient improved on re-evaluation.  She was resting comfortably.  Repeat abdominal exam reassuring. [NN]      ED Course User Index  [NN] Colette Dasilva MD                           Clinical Impression:  Final diagnoses:  [R10.9] Abdominal pain  [K52.9] Gastroenteritis (Primary)          ED Disposition Condition    Discharge Stable          ED Prescriptions       Medication Sig Dispense Start Date End Date Auth. Provider    ondansetron (ZOFRAN) 4 MG tablet Take 1 tablet (4 mg total) by mouth every 6 (six) hours. for 12 doses 12 tablet 2/26/2024 2/29/2024 Colette Dasilva MD    dicyclomine  (BENTYL) 20 mg tablet Take 1 tablet (20 mg total) by mouth 2 (two) times daily. for 10 days 20 tablet 2/26/2024 3/7/2024 Colette Dasilva MD          Follow-up Information       Follow up With Specialties Details Why Contact Info    [facility] - Emergency Dept Emergency Medicine  As needed, If symptoms worsen [id number]  [facility] [id number] [phone]             Colette Dasilva MD  02/26/24 1853

## 2024-02-27 LAB
HCV RNA SERPL QL NAA+PROBE: NOT DETECTED
HCV RNA SPEC NAA+PROBE-ACNC: NOT DETECTED IU/ML

## 2024-03-25 ENCOUNTER — OFFICE VISIT (OUTPATIENT)
Dept: GASTROENTEROLOGY | Facility: CLINIC | Age: 29
End: 2024-03-25
Payer: COMMERCIAL

## 2024-03-25 VITALS — HEIGHT: 63 IN | WEIGHT: 183.88 LBS | BODY MASS INDEX: 32.58 KG/M2

## 2024-03-25 DIAGNOSIS — K31.89 GASTRIC WALL THICKENING: Primary | ICD-10-CM

## 2024-03-25 PROCEDURE — 99999 PR PBB SHADOW E&M-EST. PATIENT-LVL III: CPT | Mod: PBBFAC,,,

## 2024-03-25 PROCEDURE — 99204 OFFICE O/P NEW MOD 45 MIN: CPT | Mod: S$GLB,,,

## 2024-03-25 RX ORDER — ONDANSETRON 4 MG/1
4 TABLET, ORALLY DISINTEGRATING ORAL EVERY 6 HOURS PRN
COMMUNITY
Start: 2024-02-26

## 2024-03-25 RX ORDER — DROSPIRENONE AND ETHINYL ESTRADIOL TABLETS 0.02-3(28)
1 KIT ORAL
COMMUNITY
Start: 2024-02-16 | End: 2025-02-15

## 2024-03-25 RX ORDER — DICYCLOMINE HYDROCHLORIDE 10 MG/1
10 CAPSULE ORAL
COMMUNITY

## 2024-03-25 RX ORDER — DICYCLOMINE HYDROCHLORIDE 10 MG/1
10 CAPSULE ORAL
Qty: 120 CAPSULE | Refills: 0 | Status: CANCELLED | OUTPATIENT
Start: 2024-03-25 | End: 2024-04-24

## 2024-03-25 NOTE — PROGRESS NOTES
"     GASTROENTEROLOGY CLINIC NOTE    Reason for visit: The encounter diagnosis was Gastric wall thickening.  Referring provider/PCP: No, Primary Doctor    HPI:  Sophia العلي is a 29 y.o. female here today for ER f/u. She presented to ER last month with severe intermittent abd pain and diarrhea. CT releaved gastroenteritis and gastric wall thickening, she was d/c with bentyl. She reports having normal BM's most days, will have urgent diarrhea with some trigger foods. She has tried GF diet which may be helping somewhat. When eating certain foods will get nausea and lower abd cramping. Bentyl helps, takes PRN.     Prior Endoscopy:  EGD:  Colon:    (Portions of this note were dictated using voice recognition software and may contain dictation related errors in spelling/grammar/syntax not found on text review)    Review of Systems   Gastrointestinal:  Positive for abdominal pain (intermittent), diarrhea (intermittent) and nausea. Negative for heartburn.       Past Medical History: has a past medical history of Allergy, Bee sting allergy, Cat allergies, Migraines, and Syncope and collapse.    Past Surgical History: has a past surgical history that includes none.    Home medications:   Current Outpatient Medications on File Prior to Visit   Medication Sig Dispense Refill    dicyclomine (BENTYL) 10 MG capsule Take 10 mg by mouth 4 (four) times daily before meals and nightly.      LORYNA, 28, 3-0.02 mg per tablet 1 tablet.      ondansetron (ZOFRAN-ODT) 4 MG TbDL Take 4 mg by mouth every 6 (six) hours as needed.       No current facility-administered medications on file prior to visit.       Vital signs:  Ht 5' 3" (1.6 m)   Wt 83.4 kg (183 lb 13.8 oz)   BMI 32.57 kg/m²     Physical Exam  Constitutional:       Appearance: Normal appearance. She is obese.   Abdominal:      General: Abdomen is flat. There is no distension.      Palpations: Abdomen is soft.      Tenderness: There is abdominal tenderness in the left upper " quadrant.   Neurological:      Mental Status: She is alert.         Results for orders placed or performed during the hospital encounter of 02/26/24 (from the past 2160 hour(s))   CT Abdomen Pelvis With IV Contrast NO Oral Contrast    Narrative    EXAMINATION:  CT ABDOMEN PELVIS WITH IV CONTRAST    CLINICAL HISTORY:  Nausea/vomiting;    TECHNIQUE:  Low dose axial images, sagittal and coronal reformations were obtained from the lung bases to the pubic symphysis following the IV administration of 100 mL of Omnipaque 350    COMPARISON:  None.    FINDINGS:  Lower chest: Unremarkable.    Liver: Unremarkable.    Gallbladder and bile ducts: Unremarkable. No biliary ductal dilatation.    Pancreas: Unremarkable.    Spleen: Unremarkable.    Adrenals: Unremarkable.    Kidneys: Unremarkable.    Lymph nodes: No abdominal or pelvic lymphadenopathy.    Bowel and mesentery: Question mild wall thickening of the stomach with mucosal hyperemia and submucosal edema.  No evidence of bowel obstruction.  Normal appendix.Nonspecific intraluminal fluid within loops of proximal colon and cecum.    Abdominal aorta: Unremarkable.    Inferior vena cava: Unremarkable.    Free fluid or free air: None.    Pelvis: Unremarkable.    Body wall: Small fat containing umbilical hernia.    Bones: Unremarkable.      Impression    1. Suggested mild wall thickening of the stomach with mucosal hyperemia and submucosal edema, as may be seen in setting of gastritis.  2. Nonspecific intraluminal fluid within cecum and proximal loops of colon, as may be seen in the setting of a nonspecific diarrheal illness.  3. Additional details, as provided in the body of report.      Electronically signed by: Jeromy Duran  Date:    02/26/2024  Time:    11:08      I have reviewed associated labs, imaging and notes.     Assessment:  1. Gastric wall thickening    ER- abd pain and diarrhea   CT with gastric wall thickening and fluid filled loops of colon  Intermittent abd  pain/cramping and diarrhea   Otherwise normal BM's   Will have nausea as well    Likely post infectious IBS- if diarrhea worsens or occurs more often, consider SS. Schedule EGD to evaluate gastric wall thickening on CT, no symptoms of gastritis currently although does have mild tenderness on abd exam. Follow GERD diet, consider PPI pending EGD results.     Plan:  Orders Placed This Encounter    Case Request Endoscopy: EGD (ESOPHAGOGASTRODUODENOSCOPY)     Schedule EGD for further evaluation  Continue willem Castellano NP  [facility] [place]

## 2024-03-25 NOTE — H&P (VIEW-ONLY)
"     GASTROENTEROLOGY CLINIC NOTE    Reason for visit: The encounter diagnosis was Gastric wall thickening.  Referring provider/PCP: No, Primary Doctor    HPI:  Sophia العلي is a 29 y.o. female here today for ER f/u. She presented to ER last month with severe intermittent abd pain and diarrhea. CT releaved gastroenteritis and gastric wall thickening, she was d/c with bentyl. She reports having normal BM's most days, will have urgent diarrhea with some trigger foods. She has tried GF diet which may be helping somewhat. When eating certain foods will get nausea and lower abd cramping. Bentyl helps, takes PRN.     Prior Endoscopy:  EGD:  Colon:    (Portions of this note were dictated using voice recognition software and may contain dictation related errors in spelling/grammar/syntax not found on text review)    Review of Systems   Gastrointestinal:  Positive for abdominal pain (intermittent), diarrhea (intermittent) and nausea. Negative for heartburn.       Past Medical History: has a past medical history of Allergy, Bee sting allergy, Cat allergies, Migraines, and Syncope and collapse.    Past Surgical History: has a past surgical history that includes none.    Home medications:   Current Outpatient Medications on File Prior to Visit   Medication Sig Dispense Refill    dicyclomine (BENTYL) 10 MG capsule Take 10 mg by mouth 4 (four) times daily before meals and nightly.      LORYNA, 28, 3-0.02 mg per tablet 1 tablet.      ondansetron (ZOFRAN-ODT) 4 MG TbDL Take 4 mg by mouth every 6 (six) hours as needed.       No current facility-administered medications on file prior to visit.       Vital signs:  Ht 5' 3" (1.6 m)   Wt 83.4 kg (183 lb 13.8 oz)   BMI 32.57 kg/m²     Physical Exam  Constitutional:       Appearance: Normal appearance. She is obese.   Abdominal:      General: Abdomen is flat. There is no distension.      Palpations: Abdomen is soft.      Tenderness: There is abdominal tenderness in the left upper " quadrant.   Neurological:      Mental Status: She is alert.         Results for orders placed or performed during the hospital encounter of 02/26/24 (from the past 2160 hour(s))   CT Abdomen Pelvis With IV Contrast NO Oral Contrast    Narrative    EXAMINATION:  CT ABDOMEN PELVIS WITH IV CONTRAST    CLINICAL HISTORY:  Nausea/vomiting;    TECHNIQUE:  Low dose axial images, sagittal and coronal reformations were obtained from the lung bases to the pubic symphysis following the IV administration of 100 mL of Omnipaque 350    COMPARISON:  None.    FINDINGS:  Lower chest: Unremarkable.    Liver: Unremarkable.    Gallbladder and bile ducts: Unremarkable. No biliary ductal dilatation.    Pancreas: Unremarkable.    Spleen: Unremarkable.    Adrenals: Unremarkable.    Kidneys: Unremarkable.    Lymph nodes: No abdominal or pelvic lymphadenopathy.    Bowel and mesentery: Question mild wall thickening of the stomach with mucosal hyperemia and submucosal edema.  No evidence of bowel obstruction.  Normal appendix.Nonspecific intraluminal fluid within loops of proximal colon and cecum.    Abdominal aorta: Unremarkable.    Inferior vena cava: Unremarkable.    Free fluid or free air: None.    Pelvis: Unremarkable.    Body wall: Small fat containing umbilical hernia.    Bones: Unremarkable.      Impression    1. Suggested mild wall thickening of the stomach with mucosal hyperemia and submucosal edema, as may be seen in setting of gastritis.  2. Nonspecific intraluminal fluid within cecum and proximal loops of colon, as may be seen in the setting of a nonspecific diarrheal illness.  3. Additional details, as provided in the body of report.      Electronically signed by: Jeromy Duran  Date:    02/26/2024  Time:    11:08      I have reviewed associated labs, imaging and notes.     Assessment:  1. Gastric wall thickening    ER- abd pain and diarrhea   CT with gastric wall thickening and fluid filled loops of colon  Intermittent abd  pain/cramping and diarrhea   Otherwise normal BM's   Will have nausea as well    Likely post infectious IBS- if diarrhea worsens or occurs more often, consider SS. Schedule EGD to evaluate gastric wall thickening on CT, no symptoms of gastritis currently although does have mild tenderness on abd exam. Follow GERD diet, consider PPI pending EGD results.     Plan:  Orders Placed This Encounter    Case Request Endoscopy: EGD (ESOPHAGOGASTRODUODENOSCOPY)     Schedule EGD for further evaluation  Continue willem Castellano NP  [facility] [place]

## 2024-03-25 NOTE — PATIENT INSTRUCTIONS
EGD Instructions    [facility] [address]  Clinic Office [phone]  Endoscopy Lab [phone]    You are scheduled for an EGD with Dr. Shirley on  4/11/24 at [facility] in [place].    Check in at the Hospital -1st floor, Information desk.   Call [phone] to reschedule.    You cannot have anything to eat or drink after Midnight. You can brush your teeth with a sip of water.     An adult friend/family member must come with you to drive you home.  You cannot drive, take a taxi, Uber/Lyft or bus to leave the Endoscopy Center alone.  If you do not have someone to drive you home, your test will be cancelled.     Please follow the directions of your doctor if you take any pills that thin your blood. If you take these meds: Aggrenox, Brilinta, Effient, Eliquis, Lovenox, Plavix, Pletal, Pradaxa, Ticilid, Xarelto or Coumadin, let the doctor's office know.    Please hold any GLP-1 medications prior to the procedure: Dulaglutide Trulicity(hold week prior), Exenatide Byetta (hold the morning of procedure), Semaglutide Ozempic (hold week prior), Liraglutide Victoza, Saxenda(hold week prior), Lixisenatide Adlyxin (hold the morning of procedure), Semaglutide Rybelsus (hold the morning of procedure), Tirzepatide Mounjaro (hold week prior)     DON'T: On the morning of the test do not take insulin or pills for diabetes.     DO: On the morning of the test, do take any pills for blood pressure, heart, anti-rejection and or seizures with a small sip of water. Bring any inhalers with you.    Leave all valuables and jewelry at home. You will be at the hospital for 2-4 hours.    Call the Endoscopy department at [phone] with any questions about your procedure.    Thank you for choosing [facility].

## 2024-04-09 ENCOUNTER — TELEPHONE (OUTPATIENT)
Dept: ENDOSCOPY | Facility: HOSPITAL | Age: 29
End: 2024-04-09
Payer: COMMERCIAL

## 2024-04-09 NOTE — TELEPHONE ENCOUNTER
Spoke with patient about arrival time @ *. 1215  Covid test =    NPO status reviewed: Patient must have nothing to eat after midnight.  Pt may have CLEAR liquids ONLY until completely NPO 3 hrs @ *915    Medications: Do not take Insulin or oral diabetic medications the day of the procedure.  Take as prescribed: heart, seizure and blood pressure medication in the morning with a sip of water (less than an ounce).  Take any breathing medications and bring inhalers to hospital with you Leave all valuables and jewelry at home.     Wear comfortable clothes to procedure to change into hospital gown You cannot drive for 24 hours after your procedure because you will receive sedation for your procedure to make you comfortable.  A ride must be provided at discharge.

## 2024-04-11 ENCOUNTER — ANESTHESIA (OUTPATIENT)
Dept: ENDOSCOPY | Facility: HOSPITAL | Age: 29
End: 2024-04-11
Payer: COMMERCIAL

## 2024-04-11 ENCOUNTER — ANESTHESIA EVENT (OUTPATIENT)
Dept: ENDOSCOPY | Facility: HOSPITAL | Age: 29
End: 2024-04-11
Payer: COMMERCIAL

## 2024-04-11 ENCOUNTER — HOSPITAL ENCOUNTER (OUTPATIENT)
Facility: HOSPITAL | Age: 29
Discharge: HOME | End: 2024-04-11
Attending: INTERNAL MEDICINE | Admitting: INTERNAL MEDICINE
Payer: COMMERCIAL

## 2024-04-11 VITALS
DIASTOLIC BLOOD PRESSURE: 70 MMHG | TEMPERATURE: 98 F | HEIGHT: 63 IN | BODY MASS INDEX: 32.43 KG/M2 | OXYGEN SATURATION: 99 % | RESPIRATION RATE: 18 BRPM | WEIGHT: 183 LBS | HEART RATE: 80 BPM | SYSTOLIC BLOOD PRESSURE: 121 MMHG

## 2024-04-11 DIAGNOSIS — R10.9 ABDOMINAL PAIN: ICD-10-CM

## 2024-04-11 LAB
B-HCG UR QL: NEGATIVE
CTP QC/QA: YES

## 2024-04-11 PROCEDURE — 37000008 HC ANESTHESIA 1ST 15 MINUTES: Performed by: INTERNAL MEDICINE

## 2024-04-11 PROCEDURE — 81025 URINE PREGNANCY TEST: CPT | Performed by: INTERNAL MEDICINE

## 2024-04-11 PROCEDURE — 37000009 HC ANESTHESIA EA ADD 15 MINS: Performed by: INTERNAL MEDICINE

## 2024-04-11 PROCEDURE — D9220A PRA ANESTHESIA: Mod: ANES,,, | Performed by: STUDENT IN AN ORGANIZED HEALTH CARE EDUCATION/TRAINING PROGRAM

## 2024-04-11 PROCEDURE — 25000003 PHARM REV CODE 250: Performed by: INTERNAL MEDICINE

## 2024-04-11 PROCEDURE — 43239 EGD BIOPSY SINGLE/MULTIPLE: CPT | Mod: ,,, | Performed by: INTERNAL MEDICINE

## 2024-04-11 PROCEDURE — 63600175 PHARM REV CODE 636 W HCPCS: Performed by: NURSE ANESTHETIST, CERTIFIED REGISTERED

## 2024-04-11 PROCEDURE — 25000003 PHARM REV CODE 250: Performed by: NURSE ANESTHETIST, CERTIFIED REGISTERED

## 2024-04-11 PROCEDURE — 88305 TISSUE EXAM BY PATHOLOGIST: CPT | Performed by: STUDENT IN AN ORGANIZED HEALTH CARE EDUCATION/TRAINING PROGRAM

## 2024-04-11 PROCEDURE — 88305 TISSUE EXAM BY PATHOLOGIST: CPT | Mod: 26,,, | Performed by: STUDENT IN AN ORGANIZED HEALTH CARE EDUCATION/TRAINING PROGRAM

## 2024-04-11 PROCEDURE — 88342 IMHCHEM/IMCYTCHM 1ST ANTB: CPT | Mod: 26,,, | Performed by: STUDENT IN AN ORGANIZED HEALTH CARE EDUCATION/TRAINING PROGRAM

## 2024-04-11 PROCEDURE — D9220A PRA ANESTHESIA: Mod: CRNA,,, | Performed by: NURSE ANESTHETIST, CERTIFIED REGISTERED

## 2024-04-11 PROCEDURE — 88342 IMHCHEM/IMCYTCHM 1ST ANTB: CPT | Performed by: STUDENT IN AN ORGANIZED HEALTH CARE EDUCATION/TRAINING PROGRAM

## 2024-04-11 PROCEDURE — 43239 EGD BIOPSY SINGLE/MULTIPLE: CPT | Performed by: INTERNAL MEDICINE

## 2024-04-11 PROCEDURE — 27201012 HC FORCEPS, HOT/COLD, DISP: Performed by: INTERNAL MEDICINE

## 2024-04-11 RX ORDER — PROPOFOL 10 MG/ML
VIAL (ML) INTRAVENOUS
Status: DISCONTINUED | OUTPATIENT
Start: 2024-04-11 | End: 2024-04-11

## 2024-04-11 RX ORDER — SODIUM CHLORIDE 9 MG/ML
INJECTION, SOLUTION INTRAVENOUS CONTINUOUS
Status: DISCONTINUED | OUTPATIENT
Start: 2024-04-11 | End: 2024-04-11 | Stop reason: HOSPADM

## 2024-04-11 RX ORDER — DEXMEDETOMIDINE HYDROCHLORIDE 100 UG/ML
INJECTION, SOLUTION INTRAVENOUS
Status: DISCONTINUED | OUTPATIENT
Start: 2024-04-11 | End: 2024-04-11

## 2024-04-11 RX ORDER — SODIUM CHLORIDE 0.9 % (FLUSH) 0.9 %
10 SYRINGE (ML) INJECTION
Status: DISCONTINUED | OUTPATIENT
Start: 2024-04-11 | End: 2024-04-11 | Stop reason: HOSPADM

## 2024-04-11 RX ORDER — PROPOFOL 10 MG/ML
VIAL (ML) INTRAVENOUS CONTINUOUS PRN
Status: DISCONTINUED | OUTPATIENT
Start: 2024-04-11 | End: 2024-04-11

## 2024-04-11 RX ORDER — LIDOCAINE HYDROCHLORIDE 20 MG/ML
INJECTION INTRAVENOUS
Status: DISCONTINUED | OUTPATIENT
Start: 2024-04-11 | End: 2024-04-11

## 2024-04-11 RX ADMIN — SODIUM CHLORIDE: 0.9 INJECTION, SOLUTION INTRAVENOUS at 01:04

## 2024-04-11 RX ADMIN — PROPOFOL 150 MCG/KG/MIN: 10 INJECTION, EMULSION INTRAVENOUS at 01:04

## 2024-04-11 RX ADMIN — GLYCOPYRROLATE 0.2 MG: 0.2 INJECTION, SOLUTION INTRAMUSCULAR; INTRAVITREAL at 01:04

## 2024-04-11 RX ADMIN — DEXMEDETOMIDINE HYDROCHLORIDE 4 MCG: 100 INJECTION, SOLUTION, CONCENTRATE INTRAVENOUS at 01:04

## 2024-04-11 RX ADMIN — LIDOCAINE HYDROCHLORIDE 100 MG: 20 INJECTION, SOLUTION INTRAVENOUS at 01:04

## 2024-04-11 RX ADMIN — PROPOFOL 100 MG: 10 INJECTION, EMULSION INTRAVENOUS at 01:04

## 2024-04-11 RX ADMIN — TOPICAL ANESTHETIC 1 EACH: 200 SPRAY DENTAL; PERIODONTAL at 01:04

## 2024-04-11 RX ADMIN — SODIUM CHLORIDE: 9 INJECTION, SOLUTION INTRAVENOUS at 01:04

## 2024-04-11 NOTE — [employer] PATIENT INSTRUCTIONS
Discharge Summary/Instructions after an Endoscopic Procedure  Patient Name: Sophia العلي  Patient MRN: [id number]  Patient YOB: 1995 Thursday, April 11, 2024  Cecil Shirley MD  Dear patient,  As a result of recent federal legislation (The Federal Cures Act), you may   receive lab or pathology results from your procedure in your [facility]   account before your physician is able to contact you. Your physician or   their representative will relay the results to you with their   recommendations at their soonest availability.  Thank you,  Your health is very important to us during the Covid Crisis. Following your   procedure today, you will receive a daily text for 2 weeks asking about   signs or symptoms of Covid 19.  Please respond to this text when you   receive it so we can follow up and keep you as safe as possible.   RESTRICTIONS:  During your procedure today, you received medications for sedation.  These   medications may affect your judgment, balance and coordination.  Therefore,   for 24 hours, you have the following restrictions:   - DO NOT drive a car, operate machinery, make legal/financial decisions,   sign important papers or drink alcohol.    ACTIVITY:  Today: no heavy lifting, straining or running due to procedural   sedation/anesthesia.  The following day: return to full activity including work.  DIET:  Eat and drink normally unless instructed otherwise.     TREATMENT FOR COMMON SIDE EFFECTS:  - Mild abdominal pain, nausea, belching, bloating or excessive gas:  rest,   eat lightly and use a heating pad.  - Sore Throat: treat with throat lozenges and/or gargle with warm salt   water.  - Because air was used during the procedure, expelling large amounts of air   from your rectum or belching is normal.  - If a bowel prep was taken, you may not have a bowel movement for 1-3 days.    This is normal.  SYMPTOMS TO WATCH FOR AND REPORT TO YOUR PHYSICIAN:  1. Abdominal pain or bloating, other  than gas cramps.  2. Chest pain.  3. Back pain.  4. Signs of infection such as: chills or fever occurring within 24 hours   after the procedure.  5. Rectal bleeding, which would show as bright red, maroon, or black stools.   (A tablespoon of blood from the rectum is not serious, especially if   hemorrhoids are present.)  6. Vomiting.  7. Weakness or dizziness.  GO DIRECTLY TO THE NEAREST EMERGENCY ROOM IF YOU HAVE ANY OF THE FOLLOWING:      Difficulty breathing              Chills and/or fever over 101 F   Persistent vomiting and/or vomiting blood   Severe abdominal pain   Severe chest pain   Black, tarry stools   Bleeding- more than one tablespoon   Any other symptom or condition that you feel may need urgent attention  Your doctor recommends these additional instructions:  If any biopsies were taken, your doctors clinic will contact you in 1 to 2   weeks with any results.  - Discharge patient to home.   - Resume previous diet.   - Continue present medications.   - Await pathology results.   - Return to nurse practitioner PRN.  For questions, problems or results please call your physician - Cecil Shirley MD.  EMERGENCY PHONE NUMBER: [phone],  LAB RESULTS: [phone]  IF A COMPLICATION OR EMERGENCY SITUATION ARISES AND YOU ARE UNABLE TO REACH   YOUR PHYSICIAN - GO DIRECTLY TO THE EMERGENCY ROOM.  Cecil Shirley MD  4/11/2024 1:53:13 PM  This report has been verified and signed electronically.  Dear patient,  As a result of recent federal legislation (The Federal Cures Act), you may   receive lab or pathology results from your procedure in your [facility]   account before your physician is able to contact you. Your physician or   their representative will relay the results to you with their   recommendations at their soonest availability.  Thank you,  [employer]

## 2024-04-11 NOTE — TRANSFER OF CARE
"Anesthesia Transfer of Care Note    Patient: Sophia العلي    Procedure(s) Performed: Procedure(s) (LRB):  EGD (ESOPHAGOGASTRODUODENOSCOPY) (N/A)    Patient location: GI    Anesthesia Type: general    Transport from OR: Transported from OR on room air with adequate spontaneous ventilation    Post pain: adequate analgesia    Post assessment: no apparent anesthetic complications and tolerated procedure well    Post vital signs: stable    Level of consciousness: awake    Nausea/Vomiting: no nausea/vomiting    Complications: none    Transfer of care protocol was followed      Last vitals: Visit Vitals  /64   Pulse 79   Temp 36.6 °C (97.9 °F)   Resp (!) 21   Ht 5' 3" (1.6 m)   Wt 83 kg (183 lb)   SpO2 100%   Breastfeeding No   BMI 32.42 kg/m²     "

## 2024-04-11 NOTE — ANESTHESIA POSTPROCEDURE EVALUATION
Anesthesia Post Evaluation    Patient: Sophia العلي    Procedure(s) Performed: Procedure(s) (LRB):  EGD (ESOPHAGOGASTRODUODENOSCOPY) (N/A)    Final Anesthesia Type: general      Patient location during evaluation: PACU  Patient participation: Yes- Able to Participate  Level of consciousness: awake and alert  Post-procedure vital signs: reviewed and stable  Pain management: adequate  Airway patency: patent    PONV status at discharge: No PONV  Anesthetic complications: no      Cardiovascular status: stable  Respiratory status: room air  Hydration status: euvolemic  Follow-up not needed.              Vitals Value Taken Time   /64 04/11/24 1356   Temp 36.6 °C (97.9 °F) 04/11/24 1356   Pulse 79 04/11/24 1356   Resp 21 04/11/24 1356   SpO2 100 % 04/11/24 1356         No case tracking events are documented in the log.      Pain/Hue Score: Hue Score: 9 (4/11/2024  1:56 PM)

## 2024-04-11 NOTE — ANESTHESIA PREPROCEDURE EVALUATION
04/11/2024  Sophia العلي is a 29 y.o., female.      Pre-op Assessment    I have reviewed the Patient Summary Reports.     I have reviewed the Nursing Notes. I have reviewed the NPO Status.   I have reviewed the Medications.     Review of Systems  Anesthesia Hx:  No previous Anesthesia   Neg history of prior surgery.          Denies Family Hx of Anesthesia complications.     Social:  Non-Smoker       Cardiovascular:  Cardiovascular Normal Exercise tolerance: good                                           Pulmonary:  Pulmonary Normal                       Renal/:  Renal/ Normal                 Hepatic/GI:  Hepatic/GI Normal                 Neurological:      Headaches     Hx of  vasovagal syncopal syncopal episode a few years ago. Cardiac workup within normal limits and not believed to be seizure                             Endocrine:  Endocrine Normal                Physical Exam  General: Cooperative, Oriented and Alert    Airway:  Mallampati: I   Mouth Opening: Normal  TM Distance: Normal  Tongue: Normal  Neck ROM: Normal ROM    Dental:  Intact        Anesthesia Plan  Type of Anesthesia, risks & benefits discussed:    Anesthesia Type: Gen Natural Airway  Intra-op Monitoring Plan: Standard ASA Monitors  Post Op Pain Control Plan: multimodal analgesia  Induction:  IV  Informed Consent: Informed consent signed with the Patient and all parties understand the risks and agree with anesthesia plan.  All questions answered.   ASA Score: 1  Day of Surgery Review of History & Physical: H&P Update referred to the surgeon/provider.    Ready For Surgery From Anesthesia Perspective.     .

## 2024-04-17 LAB
FINAL PATHOLOGIC DIAGNOSIS: NORMAL
GROSS: NORMAL
Lab: NORMAL
MICROSCOPIC EXAM: NORMAL

## 2024-04-17 NOTE — PROGRESS NOTES
Pathologic findings for recent EGD reviewed.  No evidence of H pylori infection identified.  Some mild inflammation was noted.  Continue current medications.  Follow up as needed with Millie